# Patient Record
Sex: MALE | Race: WHITE | NOT HISPANIC OR LATINO
[De-identification: names, ages, dates, MRNs, and addresses within clinical notes are randomized per-mention and may not be internally consistent; named-entity substitution may affect disease eponyms.]

---

## 2018-10-15 PROBLEM — Z00.00 ENCOUNTER FOR PREVENTIVE HEALTH EXAMINATION: Status: ACTIVE | Noted: 2018-10-15

## 2018-10-23 ENCOUNTER — APPOINTMENT (OUTPATIENT)
Dept: INTERNAL MEDICINE | Facility: CLINIC | Age: 38
End: 2018-10-23

## 2019-12-19 ENCOUNTER — INPATIENT (INPATIENT)
Facility: HOSPITAL | Age: 39
LOS: 1 days | Discharge: HOME | End: 2019-12-21
Attending: INTERNAL MEDICINE | Admitting: INTERNAL MEDICINE
Payer: COMMERCIAL

## 2019-12-19 VITALS
WEIGHT: 289.03 LBS | SYSTOLIC BLOOD PRESSURE: 129 MMHG | HEIGHT: 74 IN | DIASTOLIC BLOOD PRESSURE: 79 MMHG | OXYGEN SATURATION: 98 % | RESPIRATION RATE: 20 BRPM | TEMPERATURE: 98 F | HEART RATE: 140 BPM

## 2019-12-19 DIAGNOSIS — R00.0 TACHYCARDIA, UNSPECIFIED: ICD-10-CM

## 2019-12-19 DIAGNOSIS — R73.9 HYPERGLYCEMIA, UNSPECIFIED: ICD-10-CM

## 2019-12-19 DIAGNOSIS — E87.1 HYPO-OSMOLALITY AND HYPONATREMIA: ICD-10-CM

## 2019-12-19 DIAGNOSIS — F10.239 ALCOHOL DEPENDENCE WITH WITHDRAWAL, UNSPECIFIED: ICD-10-CM

## 2019-12-19 DIAGNOSIS — R74.0 NONSPECIFIC ELEVATION OF LEVELS OF TRANSAMINASE AND LACTIC ACID DEHYDROGENASE [LDH]: ICD-10-CM

## 2019-12-19 LAB
ALBUMIN SERPL ELPH-MCNC: 2.5 G/DL — LOW (ref 3.3–5)
ALBUMIN SERPL ELPH-MCNC: 2.6 G/DL — LOW (ref 3.3–5)
ALP SERPL-CCNC: 186 U/L — HIGH (ref 40–120)
ALP SERPL-CCNC: 209 U/L — HIGH (ref 40–120)
ALT FLD-CCNC: 162 U/L — HIGH (ref 10–45)
ALT FLD-CCNC: 210 U/L — SIGNIFICANT CHANGE UP (ref 12–78)
ALT FLD-CCNC: SIGNIFICANT CHANGE UP U/L (ref 12–78)
AMYLASE P1 CFR SERPL: 40 U/L — SIGNIFICANT CHANGE UP (ref 25–115)
ANION GAP SERPL CALC-SCNC: 11 MMOL/L — SIGNIFICANT CHANGE UP (ref 5–17)
ANION GAP SERPL CALC-SCNC: 11 MMOL/L — SIGNIFICANT CHANGE UP (ref 5–17)
AST SERPL-CCNC: 354 U/L — SIGNIFICANT CHANGE UP (ref 15–37)
AST SERPL-CCNC: 387 U/L — HIGH (ref 10–40)
AST SERPL-CCNC: SIGNIFICANT CHANGE UP U/L (ref 15–37)
BASOPHILS # BLD AUTO: 0.09 K/UL — SIGNIFICANT CHANGE UP (ref 0–0.2)
BASOPHILS NFR BLD AUTO: 1.2 % — SIGNIFICANT CHANGE UP (ref 0–2)
BILIRUB DIRECT SERPL-MCNC: 2.21 MG/DL — HIGH (ref 0.05–0.2)
BILIRUB INDIRECT FLD-MCNC: 1.5 MG/DL — HIGH (ref 0.2–1)
BILIRUB SERPL-MCNC: 3.7 MG/DL — HIGH (ref 0.2–1.2)
BILIRUB SERPL-MCNC: 4.6 MG/DL — HIGH (ref 0.2–1.2)
BUN SERPL-MCNC: 11 MG/DL — SIGNIFICANT CHANGE UP (ref 7–23)
BUN SERPL-MCNC: 11 MG/DL — SIGNIFICANT CHANGE UP (ref 7–23)
CALCIUM SERPL-MCNC: 7 MG/DL — LOW (ref 8.5–10.1)
CALCIUM SERPL-MCNC: 7 MG/DL — LOW (ref 8.5–10.1)
CHLORIDE SERPL-SCNC: 100 MMOL/L — SIGNIFICANT CHANGE UP (ref 96–108)
CHLORIDE SERPL-SCNC: 91 MMOL/L — LOW (ref 96–108)
CHOLEST SERPL-MCNC: 670 MG/DL — HIGH (ref 10–199)
CO2 SERPL-SCNC: 21 MMOL/L — LOW (ref 22–31)
CO2 SERPL-SCNC: 24 MMOL/L — SIGNIFICANT CHANGE UP (ref 22–31)
CREAT SERPL-MCNC: 1.03 MG/DL — SIGNIFICANT CHANGE UP (ref 0.5–1.3)
CREAT SERPL-MCNC: 1.16 MG/DL — SIGNIFICANT CHANGE UP (ref 0.5–1.3)
EOSINOPHIL # BLD AUTO: 0.03 K/UL — SIGNIFICANT CHANGE UP (ref 0–0.5)
EOSINOPHIL NFR BLD AUTO: 0.4 % — SIGNIFICANT CHANGE UP (ref 0–6)
GLUCOSE BLDC GLUCOMTR-MCNC: 107 MG/DL — HIGH (ref 70–99)
GLUCOSE BLDC GLUCOMTR-MCNC: 141 MG/DL — HIGH (ref 70–99)
GLUCOSE BLDC GLUCOMTR-MCNC: 165 MG/DL — HIGH (ref 70–99)
GLUCOSE SERPL-MCNC: 204 MG/DL — HIGH (ref 70–99)
GLUCOSE SERPL-MCNC: 213 MG/DL — HIGH (ref 70–99)
HCT VFR BLD CALC: 42.4 % — SIGNIFICANT CHANGE UP (ref 39–50)
HDLC SERPL-MCNC: 5 MG/DL — LOW
HGB BLD-MCNC: 15.2 G/DL — SIGNIFICANT CHANGE UP (ref 13–17)
IMM GRANULOCYTES NFR BLD AUTO: 0.4 % — SIGNIFICANT CHANGE UP (ref 0–1.5)
INR BLD: 1.08 RATIO — SIGNIFICANT CHANGE UP (ref 0.88–1.16)
LIDOCAIN IGE QN: 283 U/L — SIGNIFICANT CHANGE UP (ref 73–393)
LIPID PNL WITH DIRECT LDL SERPL: SIGNIFICANT CHANGE UP MG/DL
LYMPHOCYTES # BLD AUTO: 3.38 K/UL — HIGH (ref 1–3.3)
LYMPHOCYTES # BLD AUTO: 45.4 % — HIGH (ref 13–44)
MAGNESIUM SERPL-MCNC: 1.8 MG/DL — SIGNIFICANT CHANGE UP (ref 1.6–2.6)
MAGNESIUM SERPL-MCNC: 2.1 MG/DL — SIGNIFICANT CHANGE UP (ref 1.6–2.6)
MCHC RBC-ENTMCNC: 30.1 PG — SIGNIFICANT CHANGE UP (ref 27–34)
MCHC RBC-ENTMCNC: 35.8 GM/DL — SIGNIFICANT CHANGE UP (ref 32–36)
MCV RBC AUTO: 84 FL — SIGNIFICANT CHANGE UP (ref 80–100)
MONOCYTES # BLD AUTO: 0.54 K/UL — SIGNIFICANT CHANGE UP (ref 0–0.9)
MONOCYTES NFR BLD AUTO: 7.3 % — SIGNIFICANT CHANGE UP (ref 2–14)
NEUTROPHILS # BLD AUTO: 3.37 K/UL — SIGNIFICANT CHANGE UP (ref 1.8–7.4)
NEUTROPHILS NFR BLD AUTO: 45.3 % — SIGNIFICANT CHANGE UP (ref 43–77)
NRBC # BLD: 0 /100 WBCS — SIGNIFICANT CHANGE UP (ref 0–0)
PHOSPHATE SERPL-MCNC: 0.8 MG/DL — CRITICAL LOW (ref 2.5–4.5)
PLATELET # BLD AUTO: 130 K/UL — LOW (ref 150–400)
POTASSIUM SERPL-MCNC: 4 MMOL/L — SIGNIFICANT CHANGE UP (ref 3.5–5.3)
POTASSIUM SERPL-MCNC: 4.8 MMOL/L — SIGNIFICANT CHANGE UP (ref 3.5–5.3)
POTASSIUM SERPL-SCNC: 4 MMOL/L — SIGNIFICANT CHANGE UP (ref 3.5–5.3)
POTASSIUM SERPL-SCNC: 4.8 MMOL/L — SIGNIFICANT CHANGE UP (ref 3.5–5.3)
PROT SERPL-MCNC: 6.9 GM/DL — SIGNIFICANT CHANGE UP (ref 6–8.3)
PROT SERPL-MCNC: 7.6 GM/DL — SIGNIFICANT CHANGE UP (ref 6–8.3)
PROTHROM AB SERPL-ACNC: 12.1 SEC — SIGNIFICANT CHANGE UP (ref 10–12.9)
RBC # BLD: 5.05 M/UL — SIGNIFICANT CHANGE UP (ref 4.2–5.8)
RBC # FLD: 14.4 % — SIGNIFICANT CHANGE UP (ref 10.3–14.5)
SODIUM SERPL-SCNC: 126 MMOL/L — LOW (ref 135–145)
SODIUM SERPL-SCNC: 132 MMOL/L — LOW (ref 135–145)
TOTAL CHOLESTEROL/HDL RATIO MEASUREMENT: 134 RATIO — HIGH (ref 3.4–9.6)
TRIGL SERPL-MCNC: 4080 MG/DL — HIGH (ref 10–149)
TROPONIN I SERPL-MCNC: <.015 NG/ML — SIGNIFICANT CHANGE UP (ref 0.01–0.04)
TSH SERPL-MCNC: 3.54 UIU/ML — SIGNIFICANT CHANGE UP (ref 0.36–3.74)
WBC # BLD: 7.44 K/UL — SIGNIFICANT CHANGE UP (ref 3.8–10.5)
WBC # FLD AUTO: 7.44 K/UL — SIGNIFICANT CHANGE UP (ref 3.8–10.5)

## 2019-12-19 PROCEDURE — 76700 US EXAM ABDOM COMPLETE: CPT | Mod: 26

## 2019-12-19 PROCEDURE — 99285 EMERGENCY DEPT VISIT HI MDM: CPT

## 2019-12-19 PROCEDURE — 71045 X-RAY EXAM CHEST 1 VIEW: CPT | Mod: 26

## 2019-12-19 PROCEDURE — 99223 1ST HOSP IP/OBS HIGH 75: CPT

## 2019-12-19 PROCEDURE — 93010 ELECTROCARDIOGRAM REPORT: CPT

## 2019-12-19 RX ORDER — SODIUM CHLORIDE 9 MG/ML
1000 INJECTION, SOLUTION INTRAVENOUS
Refills: 0 | Status: DISCONTINUED | OUTPATIENT
Start: 2019-12-19 | End: 2019-12-21

## 2019-12-19 RX ORDER — SODIUM CHLORIDE 9 MG/ML
1000 INJECTION INTRAMUSCULAR; INTRAVENOUS; SUBCUTANEOUS ONCE
Refills: 0 | Status: COMPLETED | OUTPATIENT
Start: 2019-12-19 | End: 2019-12-19

## 2019-12-19 RX ORDER — INSULIN LISPRO 100/ML
VIAL (ML) SUBCUTANEOUS AT BEDTIME
Refills: 0 | Status: DISCONTINUED | OUTPATIENT
Start: 2019-12-19 | End: 2019-12-21

## 2019-12-19 RX ORDER — PANTOPRAZOLE SODIUM 20 MG/1
40 TABLET, DELAYED RELEASE ORAL ONCE
Refills: 0 | Status: COMPLETED | OUTPATIENT
Start: 2019-12-19 | End: 2019-12-19

## 2019-12-19 RX ORDER — INSULIN LISPRO 100/ML
VIAL (ML) SUBCUTANEOUS
Refills: 0 | Status: DISCONTINUED | OUTPATIENT
Start: 2019-12-19 | End: 2019-12-21

## 2019-12-19 RX ORDER — DEXTROSE 50 % IN WATER 50 %
25 SYRINGE (ML) INTRAVENOUS ONCE
Refills: 0 | Status: DISCONTINUED | OUTPATIENT
Start: 2019-12-19 | End: 2019-12-21

## 2019-12-19 RX ORDER — SODIUM CHLORIDE 9 MG/ML
1000 INJECTION INTRAMUSCULAR; INTRAVENOUS; SUBCUTANEOUS
Refills: 0 | Status: DISCONTINUED | OUTPATIENT
Start: 2019-12-19 | End: 2019-12-21

## 2019-12-19 RX ORDER — SODIUM CHLORIDE 9 MG/ML
2000 INJECTION INTRAMUSCULAR; INTRAVENOUS; SUBCUTANEOUS ONCE
Refills: 0 | Status: COMPLETED | OUTPATIENT
Start: 2019-12-19 | End: 2019-12-19

## 2019-12-19 RX ORDER — FAMOTIDINE 10 MG/ML
20 INJECTION INTRAVENOUS ONCE
Refills: 0 | Status: COMPLETED | OUTPATIENT
Start: 2019-12-19 | End: 2019-12-19

## 2019-12-19 RX ORDER — DEXTROSE 50 % IN WATER 50 %
15 SYRINGE (ML) INTRAVENOUS ONCE
Refills: 0 | Status: DISCONTINUED | OUTPATIENT
Start: 2019-12-19 | End: 2019-12-21

## 2019-12-19 RX ORDER — INFLUENZA VIRUS VACCINE 15; 15; 15; 15 UG/.5ML; UG/.5ML; UG/.5ML; UG/.5ML
0.5 SUSPENSION INTRAMUSCULAR ONCE
Refills: 0 | Status: COMPLETED | OUTPATIENT
Start: 2019-12-19 | End: 2019-12-19

## 2019-12-19 RX ORDER — GLUCAGON INJECTION, SOLUTION 0.5 MG/.1ML
1 INJECTION, SOLUTION SUBCUTANEOUS ONCE
Refills: 0 | Status: DISCONTINUED | OUTPATIENT
Start: 2019-12-19 | End: 2019-12-21

## 2019-12-19 RX ORDER — DEXTROSE 50 % IN WATER 50 %
12.5 SYRINGE (ML) INTRAVENOUS ONCE
Refills: 0 | Status: DISCONTINUED | OUTPATIENT
Start: 2019-12-19 | End: 2019-12-21

## 2019-12-19 RX ORDER — SODIUM CHLORIDE 9 MG/ML
1000 INJECTION, SOLUTION INTRAVENOUS
Refills: 0 | Status: COMPLETED | OUTPATIENT
Start: 2019-12-19 | End: 2019-12-19

## 2019-12-19 RX ADMIN — SODIUM CHLORIDE 150 MILLILITER(S): 9 INJECTION, SOLUTION INTRAVENOUS at 08:36

## 2019-12-19 RX ADMIN — Medication 50 MILLIGRAM(S): at 11:00

## 2019-12-19 RX ADMIN — SODIUM CHLORIDE 2000 MILLILITER(S): 9 INJECTION INTRAMUSCULAR; INTRAVENOUS; SUBCUTANEOUS at 06:00

## 2019-12-19 RX ADMIN — FAMOTIDINE 20 MILLIGRAM(S): 10 INJECTION INTRAVENOUS at 03:22

## 2019-12-19 RX ADMIN — SODIUM CHLORIDE 80 MILLILITER(S): 9 INJECTION INTRAMUSCULAR; INTRAVENOUS; SUBCUTANEOUS at 18:14

## 2019-12-19 RX ADMIN — SODIUM CHLORIDE 1000 MILLILITER(S): 9 INJECTION INTRAMUSCULAR; INTRAVENOUS; SUBCUTANEOUS at 06:25

## 2019-12-19 RX ADMIN — Medication 85 MILLIMOLE(S): at 15:29

## 2019-12-19 RX ADMIN — PANTOPRAZOLE SODIUM 40 MILLIGRAM(S): 20 TABLET, DELAYED RELEASE ORAL at 03:22

## 2019-12-19 RX ADMIN — SODIUM CHLORIDE 1000 MILLILITER(S): 9 INJECTION INTRAMUSCULAR; INTRAVENOUS; SUBCUTANEOUS at 07:30

## 2019-12-19 RX ADMIN — SODIUM CHLORIDE 1000 MILLILITER(S): 9 INJECTION INTRAMUSCULAR; INTRAVENOUS; SUBCUTANEOUS at 03:20

## 2019-12-19 RX ADMIN — Medication 2 MILLIGRAM(S): at 07:03

## 2019-12-19 RX ADMIN — Medication 30 MILLILITER(S): at 03:23

## 2019-12-19 RX ADMIN — Medication 50 MILLIGRAM(S): at 18:14

## 2019-12-19 NOTE — ED ADULT NURSE NOTE - CHIEF COMPLAINT QUOTE
Pt c/o Palpitation. Pt stated he eat something at his [employer] party that made him nauseous. 1 episode of emesis. denies any drug use

## 2019-12-19 NOTE — H&P ADULT - ASSESSMENT
39 male with history of alchohol abuse presents with tremors tachycardia and ciwa of 8 and will be started on ciwa protocol         IMPROVE VTE Individual Risk Assessment          RISK                                                          Points    [  ] Previous VTE                                                3    [  ] Thrombophilia                                             2    [  ] Lower limb paralysis                                    2        (unable to hold up >15 seconds)      [  ] Current Cancer                                             2         (within 6 months)    [  ] Immobilization > 24 hrs                              1    [  ] ICU/CCU stay > 24 hours                            1    [  ] Age > 60                                                    1    IMPROVE VTE Score _____0____

## 2019-12-19 NOTE — ED PROVIDER NOTE - CLINICAL SUMMARY MEDICAL DECISION MAKING FREE TEXT BOX
Patient p/w palpitations, CIWA 8.  Patient improving with ER measures.  Receiving IVF, banana bag, ativan.  Labs with hyperglycemia, LFTs, lipase/amylase pending.  Lipid profile also pending.  Patient to be admitted after labs result.  Patient signed out to incoming physician Dr. Finnegan.  All decisions regarding the progression of care will be made at their discretion. Patient p/w palpitations, CIWA 8.  Patient improving with ER measures, gastric burning improved.  Receiving IVF, banana bag, ativan.  Labs with hyperglycemia, LFTs, lipase/amylase pending.  Lipid profile also pending.  Patient to be admitted after labs result.  Patient signed out to incoming physician Dr. Finnegan.  All decisions regarding the progression of care will be made at their discretion.

## 2019-12-19 NOTE — ED ADULT NURSE NOTE - OBJECTIVE STATEMENT
Pt c/o Palpitation. Pt stated he eat something at his [employer] party that made him nauseous. 1 episode of emesis. denies any drug use. Very anxious. Awaiting to be seen

## 2019-12-19 NOTE — H&P ADULT - HISTORY OF PRESENT ILLNESS
Patient presents to the ED for palpitations and chest pain.  Patient reports feeling heart racing and pounding for the last hour, says that he's been drinking excessively over the last week, consumes 1 bottle of wine every night and not drinking water.  Last drink was 5 pm yesterday.  He feels substernal pressure and burning going up to his throat as well.  He denies any dyspnea, leg swelling.  Denies tearing chest pain radiating to back.  Also does not have nausea or vomiting. Patient presents to the ED for palpitations and chest pain.  Patient reports feeling heart racing and pounding for the last hour, says that he's been drinking excessively over the last week, consumes 1 bottle of wine every night and not drinking water.  Last drink was 5 pm yesterday.  He feels substernal pressure and burning going up to his throat as well.  He denies any dyspnea, leg swelling.  Denies tearing chest pain radiating to back.  Also does not have nausea or vomiting no wbut he had it yestersday

## 2019-12-19 NOTE — ED PROVIDER NOTE - PROGRESS NOTE DETAILS
Pt was seen and treated by Dr. Fermin for alcohol admission. Pt is alert and oriented x 3 smiling pleasant and sts he felling much better. CIWA score is decreased from 7 to 4 but pt is still having tach  to 125. Dr. Brito is notified and admit pt to Dr. Paula.

## 2019-12-19 NOTE — ED ADULT NURSE NOTE - ED STAT RN HANDOFF DETAILS 2
Report endorsed to oncoming RN. Safety checks compld this shift/Safety rounds completed hourly.  IV sites checked Q2+remains WDL. Meds given as ord with no s/s of adverse RXNs. Fall +skin precs in place. Any issues endorsed to oncoming RN for follow up. Awaiting admit

## 2019-12-19 NOTE — ED PROVIDER NOTE - PHYSICAL EXAMINATION
Gen: Alert, anxious, well appearing  Head: NC, AT, PERRL, EOMI, normal lids/conjunctiva  ENT: normal hearing, patent oropharynx without erythema/exudate, uvula midline  Neck: +supple, no tenderness/meningismus/JVD, +Trachea midline  Pulm: Bilateral BS, normal resp effort, no wheeze/stridor/retractions  CV: tachycardia, no M/R/G, +dist pulses  Abd: soft, NT/ND, Negative [place] signs, +BS, no palpable masses  Mskel: no edema/erythema/cyanosis  Skin: no rash, warm/dry  Neuro: AAOx3, no apparent sensory/motor deficits, coordination intact Gen: Alert, anxious, well appearing  Head: NC, AT, PERRL, EOMI, normal lids/conjunctiva  ENT: normal hearing, patent oropharynx without erythema/exudate, uvula midline  Neck: +supple, no tenderness/meningismus/JVD, +Trachea midline  Pulm: Bilateral BS, normal resp effort, no wheeze/stridor/retractions  CV: tachycardia, no M/R/G, +dist pulses  Abd: soft, +mild epigastric discomfort, ND, Negative [place] signs, +BS, no palpable masses  Mskel: no edema/erythema/cyanosis  Skin: no rash, warm/dry  Neuro: AAOx3, no apparent sensory/motor deficits, coordination intact

## 2019-12-19 NOTE — ED ADULT NURSE NOTE - NSIMPLEMENTINTERV_GEN_ALL_ED
Implemented All Universal Safety Interventions:  [place] to call system. Call bell, personal items and telephone within reach. Instruct patient to call for assistance. Room bathroom lighting operational. Non-slip footwear when patient is off stretcher. Physically safe environment: no spills, clutter or unnecessary equipment. Stretcher in lowest position, wheels locked, appropriate side rails in place.

## 2019-12-19 NOTE — ED ADULT NURSE NOTE - ED STAT RN HANDOFF DETAILS
Report endorsed to oncoming RN NL for my break coverage. Report given to covering RN and patient informed during rounding Safety checks compld this shift/Safety rounds completed hourly.  Fall +skin precs in place. Any issues endorsed to oncoming RN for follow up. RN Assumed patient's care for coverage.

## 2019-12-19 NOTE — ED ADULT TRIAGE NOTE - CHIEF COMPLAINT QUOTE
Pt c/o Palpitation. Pt stated he eat something at his [employer] party that made him nauseous. 1 episode of emesis. denies any drug use

## 2019-12-19 NOTE — ED ADULT NURSE REASSESSMENT NOTE - NS ED NURSE REASSESS COMMENT FT1
Patient received at 0715 n bed 10 , sitting up at bedside, a/o x4 denies any complaints at this time, he is sinus tachycardia on cardiac monitor . Mild hand tremors observed. Will continue to observe. Ambulated with supervision to bathroom and back to bed.

## 2019-12-19 NOTE — H&P ADULT - NSHPREVIEWOFSYSTEMS_GEN_ALL_CORE

## 2019-12-19 NOTE — ED PROVIDER NOTE - OBJECTIVE STATEMENT
Pertinent PMH/PSH/FHx/SHx and Review of Systems contained within:  Patient presents to the ED for palpitations and chest pain.  Patient reports feeling heart racing and pounding for the last hour, says that he's been drinking excessively over the last week, consumes 1 bottle of wine every night and not drinking water.  He feels substernal pressure and burning going up to his throat as well.  He denies any dyspnea, leg swelling.  Denies tearing chest pain radiating to back.  Also does not have nausea or vomiting.     Relevant PMHx/SHx/SOCHx/FAMH:  Denies relevant pmh or psh, no family h/o early cardiac death  Nonsmoker, daily drinker, denies other illicit drugs    ROS: No fever/chills, No headache/photophobia/eye pain/changes in vision, No ear pain/sore throat/dysphagia, no SOB/cough/wheeze/stridor, No abdominal pain, No N/V/D/melena, no dysuria/frequency/discharge, No neck/back pain, no rash, no changes in neurological status/function. Pertinent PMH/PSH/FHx/SHx and Review of Systems contained within:  Patient presents to the ED for palpitations and chest pain.  Patient reports feeling heart racing and pounding for the last hour, says that he's been drinking excessively over the last week, consumes 1 bottle of wine every night and not drinking water.  Last drink was 5 pm yesterday.  He feels substernal pressure and burning going up to his throat as well.  He denies any dyspnea, leg swelling.  Denies tearing chest pain radiating to back.  Also does not have nausea or vomiting.     Relevant PMHx/SHx/SOCHx/FAMH:  Denies relevant pmh or psh, no family h/o early cardiac death  Nonsmoker, daily drinker, denies other illicit drugs    ROS: No fever/chills, No headache/photophobia/eye pain/changes in vision, No ear pain/sore throat/dysphagia, no SOB/cough/wheeze/stridor, No abdominal pain, No N/V/D/melena, no dysuria/frequency/discharge, No neck/back pain, no rash, no changes in neurological status/function.

## 2019-12-19 NOTE — H&P ADULT - NSHPPHYSICALEXAM_GEN_ALL_CORE
ICU Vital Signs Last 24 Hrs  T(C): 37.3 (19 Dec 2019 09:04), Max: 37.3 (19 Dec 2019 07:31)  T(F): 99.2 (19 Dec 2019 09:04), Max: 99.2 (19 Dec 2019 07:31)  HR: 102 (19 Dec 2019 09:04) (101 - 140)  BP: 137/77 (19 Dec 2019 09:04) (129/79 - 152/72)  BP(mean): --  ABP: --  ABP(mean): --  RR: 13 (19 Dec 2019 09:04) (13 - 20)  SpO2: 97% (19 Dec 2019 09:04) (96% - 98%)  GENERAL: NAD well-developed  HEAD:  Atraumatic, Normocephalic  EYES: EOMI, PERRLA, conjunctiva and sclera clear  ENMT: No tonsillar erythema, exudates, or enlargement; Moist mucous membranes, Good dentition, No lesions  NECK: Supple, No JVD, Normal thyroid  NERVOUS SYSTEM:  Alert & Oriented X3, Good concentration; Motor Strength 5/5 B/L upper and lower extremities; DTRs 2+ intact and symmetric  CHEST/LUNG: Clear to percussion bilaterally; No rales, rhonchi, wheezing, or rubs  HEART: Regular rate and rhythm; No murmurs, rubs, or gallops  ABDOMEN: Soft, Nontender, Nondistended; Bowel sounds present  EXTREMITIES:  2+ Peripheral Pulses, No clubbing, cyanosis, or edema  LYMPH: No lymphadenopathy   SKIN: No rashes or lesions ICU Vital Signs Last 24 Hrs  T(C): 37.3 (19 Dec 2019 09:04), Max: 37.3 (19 Dec 2019 07:31)  T(F): 99.2 (19 Dec 2019 09:04), Max: 99.2 (19 Dec 2019 07:31)  HR: 102 (19 Dec 2019 09:04) (101 - 140)  BP: 137/77 (19 Dec 2019 09:04) (129/79 - 152/72)  BP(mean): --  ABP: --  ABP(mean): --  RR: 13 (19 Dec 2019 09:04) (13 - 20)  SpO2: 97% (19 Dec 2019 09:04) (96% - 98%)  GENERAL: NAD well-developed  HEAD:  Atraumatic, Normocephalic  EYES: EOMI, PERRLA, conjunctiva and sclera clear  ENMT: No tonsillar erythema, exudates, or enlargement; Moist mucous membranes, Good dentition, No lesions  NECK: Supple, No JVD, Normal thyroid  NERVOUS SYSTEM:  Alert & Oriented X3, Good concentration; Motor Strength 5/5 B/L upper and lower extremities; DTRs 2+ intact and symmetric  CHEST/LUNG: Clear to percussion bilaterally; No rales, rhonchi, wheezing, or rubs  HEART: Regular rate and rhythm; No murmurs, rubs, or gallops  ABDOMEN: Soft, Nontender, Nondistended; Bowel sounds present  EXTREMITIES:  2+ Peripheral Pulses, No clubbing, cyanosis, or edema POSITIVE tremors   LYMPH: No lymphadenopathy   SKIN: No rashes or lesions

## 2019-12-19 NOTE — ED PROVIDER NOTE - CARE PLAN
Principal Discharge DX:	Alcohol withdrawal  Secondary Diagnosis:	Hyperglycemia  Secondary Diagnosis:	Dehydration

## 2019-12-20 LAB
ALBUMIN SERPL ELPH-MCNC: 2.7 G/DL — LOW (ref 3.3–5)
ALP SERPL-CCNC: 198 U/L — HIGH (ref 40–120)
ALT FLD-CCNC: 174 U/L — HIGH (ref 12–78)
ANION GAP SERPL CALC-SCNC: 7 MMOL/L — SIGNIFICANT CHANGE UP (ref 5–17)
AST SERPL-CCNC: 256 U/L — HIGH (ref 15–37)
BILIRUB DIRECT SERPL-MCNC: 1.99 MG/DL — HIGH (ref 0.05–0.2)
BILIRUB INDIRECT FLD-MCNC: 1.1 MG/DL — HIGH (ref 0.2–1)
BILIRUB SERPL-MCNC: 3.1 MG/DL — HIGH (ref 0.2–1.2)
BUN SERPL-MCNC: 7 MG/DL — SIGNIFICANT CHANGE UP (ref 7–23)
CALCIUM SERPL-MCNC: 8.1 MG/DL — LOW (ref 8.5–10.1)
CHLORIDE SERPL-SCNC: 103 MMOL/L — SIGNIFICANT CHANGE UP (ref 96–108)
CO2 SERPL-SCNC: 26 MMOL/L — SIGNIFICANT CHANGE UP (ref 22–31)
CREAT SERPL-MCNC: 1.01 MG/DL — SIGNIFICANT CHANGE UP (ref 0.5–1.3)
GLUCOSE BLDC GLUCOMTR-MCNC: 112 MG/DL — HIGH (ref 70–99)
GLUCOSE SERPL-MCNC: 118 MG/DL — HIGH (ref 70–99)
HBA1C BLD-MCNC: 6.1 % — HIGH (ref 4–5.6)
HCT VFR BLD CALC: 36.2 % — LOW (ref 39–50)
HGB BLD-MCNC: 12 G/DL — LOW (ref 13–17)
MAGNESIUM SERPL-MCNC: 2.1 MG/DL — SIGNIFICANT CHANGE UP (ref 1.6–2.6)
MCHC RBC-ENTMCNC: 28.8 PG — SIGNIFICANT CHANGE UP (ref 27–34)
MCHC RBC-ENTMCNC: 33.1 GM/DL — SIGNIFICANT CHANGE UP (ref 32–36)
MCV RBC AUTO: 87 FL — SIGNIFICANT CHANGE UP (ref 80–100)
NRBC # BLD: 0 /100 WBCS — SIGNIFICANT CHANGE UP (ref 0–0)
OB PNL STL: POSITIVE
PHOSPHATE SERPL-MCNC: 2.2 MG/DL — LOW (ref 2.5–4.5)
PLATELET # BLD AUTO: 67 K/UL — LOW (ref 150–400)
POTASSIUM SERPL-MCNC: 3.8 MMOL/L — SIGNIFICANT CHANGE UP (ref 3.5–5.3)
POTASSIUM SERPL-SCNC: 3.8 MMOL/L — SIGNIFICANT CHANGE UP (ref 3.5–5.3)
PROT SERPL-MCNC: 6.8 GM/DL — SIGNIFICANT CHANGE UP (ref 6–8.3)
RBC # BLD: 4.16 M/UL — LOW (ref 4.2–5.8)
RBC # FLD: 14.4 % — SIGNIFICANT CHANGE UP (ref 10.3–14.5)
SODIUM SERPL-SCNC: 136 MMOL/L — SIGNIFICANT CHANGE UP (ref 135–145)
WBC # BLD: 5.52 K/UL — SIGNIFICANT CHANGE UP (ref 3.8–10.5)
WBC # FLD AUTO: 5.52 K/UL — SIGNIFICANT CHANGE UP (ref 3.8–10.5)

## 2019-12-20 PROCEDURE — 99233 SBSQ HOSP IP/OBS HIGH 50: CPT

## 2019-12-20 RX ORDER — ATORVASTATIN CALCIUM 80 MG/1
40 TABLET, FILM COATED ORAL AT BEDTIME
Refills: 0 | Status: DISCONTINUED | OUTPATIENT
Start: 2019-12-20 | End: 2019-12-21

## 2019-12-20 RX ORDER — SUCRALFATE 1 G
1 TABLET ORAL
Refills: 0 | Status: DISCONTINUED | OUTPATIENT
Start: 2019-12-20 | End: 2019-12-21

## 2019-12-20 RX ORDER — PANTOPRAZOLE SODIUM 20 MG/1
40 TABLET, DELAYED RELEASE ORAL
Refills: 0 | Status: DISCONTINUED | OUTPATIENT
Start: 2019-12-20 | End: 2019-12-21

## 2019-12-20 RX ORDER — SODIUM,POTASSIUM PHOSPHATES 278-250MG
1 POWDER IN PACKET (EA) ORAL ONCE
Refills: 0 | Status: COMPLETED | OUTPATIENT
Start: 2019-12-20 | End: 2019-12-20

## 2019-12-20 RX ADMIN — Medication 1 PACKET(S): at 22:53

## 2019-12-20 RX ADMIN — Medication 1 GRAM(S): at 13:53

## 2019-12-20 RX ADMIN — Medication 50 MILLIGRAM(S): at 14:58

## 2019-12-20 RX ADMIN — Medication 50 MILLIGRAM(S): at 22:03

## 2019-12-20 RX ADMIN — Medication 50 MILLIGRAM(S): at 00:15

## 2019-12-20 RX ADMIN — Medication 1 GRAM(S): at 19:17

## 2019-12-20 RX ADMIN — Medication 50 MILLIGRAM(S): at 05:34

## 2019-12-20 RX ADMIN — ATORVASTATIN CALCIUM 40 MILLIGRAM(S): 80 TABLET, FILM COATED ORAL at 22:03

## 2019-12-20 NOTE — PROGRESS NOTE ADULT - SUBJECTIVE AND OBJECTIVE BOX
Pt is feeling better today but noticed black stools  Small drop in H/H but stable  electrolytes improved    MEDICATIONS  (STANDING):  chlordiazePOXIDE   Oral   chlordiazePOXIDE 50 milliGRAM(s) Oral every 8 hours  dextrose 5%. 1000 milliLiter(s) (50 mL/Hr) IV Continuous <Continuous>  dextrose 50% Injectable 12.5 Gram(s) IV Push once  dextrose 50% Injectable 25 Gram(s) IV Push once  dextrose 50% Injectable 25 Gram(s) IV Push once  insulin lispro (HumaLOG) corrective regimen sliding scale   SubCutaneous three times a day before meals  insulin lispro (HumaLOG) corrective regimen sliding scale   SubCutaneous at bedtime  pantoprazole    Tablet 40 milliGRAM(s) Oral before breakfast  sodium chloride 0.9%. 1000 milliLiter(s) (80 mL/Hr) IV Continuous <Continuous>  sucralfate suspension 1 Gram(s) Oral four times a day    MEDICATIONS  (PRN):  dextrose 40% Gel 15 Gram(s) Oral once PRN Blood Glucose LESS THAN 70 milliGRAM(s)/deciliter  glucagon  Injectable 1 milliGRAM(s) IntraMuscular once PRN Glucose LESS THAN 70 milligrams/deciliter      Allergies    No Known Allergies    Intolerances        Vital Signs Last 24 Hrs  T(C): 36.7 (20 Dec 2019 05:26), Max: 37.7 (19 Dec 2019 16:25)  T(F): 98.1 (20 Dec 2019 05:26), Max: 99.8 (19 Dec 2019 16:25)  HR: 95 (20 Dec 2019 05:26) (95 - 115)  BP: 135/82 (20 Dec 2019 05:26) (135/82 - 154/91)  BP(mean): --  RR: 18 (20 Dec 2019 05:26) (16 - 18)  SpO2: 97% (20 Dec 2019 05:26) (96% - 98%)    PHYSICAL EXAM:  General: NAD.  CVS: S1, S2  Chest: air entry bilaterally present  Abd: BS present, soft, non-tender      LABS:                        12.0   5.52  )-----------( 67       ( 20 Dec 2019 07:18 )             36.2     12-20    136  |  103  |  7   ----------------------------<  118<H>  3.8   |  26  |  1.01    Ca    8.1<L>      20 Dec 2019 07:18  Phos  2.2     12-20  Mg     2.1     12-20    TPro  6.8  /  Alb  2.7<L>  /  TBili  3.1<H>  /  DBili  1.99<H>  /  AST  256<H>  /  ALT  174<H>  /  AlkPhos  198<H>  12-20    PT/INR - ( 19 Dec 2019 03:12 )   PT: 12.1 sec;   INR: 1.08 ratio           start carafate 1g PO QID and protonix 40mg daily  follow CBC  will arrange for outpatient EGD Pt is feeling better today but noticed black stools  Small drop in H/H but stable; LFT's a little better  electrolytes improved    MEDICATIONS  (STANDING):  chlordiazePOXIDE   Oral   chlordiazePOXIDE 50 milliGRAM(s) Oral every 8 hours  dextrose 5%. 1000 milliLiter(s) (50 mL/Hr) IV Continuous <Continuous>  dextrose 50% Injectable 12.5 Gram(s) IV Push once  dextrose 50% Injectable 25 Gram(s) IV Push once  dextrose 50% Injectable 25 Gram(s) IV Push once  insulin lispro (HumaLOG) corrective regimen sliding scale   SubCutaneous three times a day before meals  insulin lispro (HumaLOG) corrective regimen sliding scale   SubCutaneous at bedtime  pantoprazole    Tablet 40 milliGRAM(s) Oral before breakfast  sodium chloride 0.9%. 1000 milliLiter(s) (80 mL/Hr) IV Continuous <Continuous>  sucralfate suspension 1 Gram(s) Oral four times a day    MEDICATIONS  (PRN):  dextrose 40% Gel 15 Gram(s) Oral once PRN Blood Glucose LESS THAN 70 milliGRAM(s)/deciliter  glucagon  Injectable 1 milliGRAM(s) IntraMuscular once PRN Glucose LESS THAN 70 milligrams/deciliter      Allergies    No Known Allergies    Intolerances        Vital Signs Last 24 Hrs  T(C): 36.7 (20 Dec 2019 05:26), Max: 37.7 (19 Dec 2019 16:25)  T(F): 98.1 (20 Dec 2019 05:26), Max: 99.8 (19 Dec 2019 16:25)  HR: 95 (20 Dec 2019 05:26) (95 - 115)  BP: 135/82 (20 Dec 2019 05:26) (135/82 - 154/91)  BP(mean): --  RR: 18 (20 Dec 2019 05:26) (16 - 18)  SpO2: 97% (20 Dec 2019 05:26) (96% - 98%)    PHYSICAL EXAM:  General: NAD.  CVS: S1, S2  Chest: air entry bilaterally present  Abd: BS present, soft, non-tender      LABS:                        12.0   5.52  )-----------( 67       ( 20 Dec 2019 07:18 )             36.2     12-20    136  |  103  |  7   ----------------------------<  118<H>  3.8   |  26  |  1.01    Ca    8.1<L>      20 Dec 2019 07:18  Phos  2.2     12-20  Mg     2.1     12-20    TPro  6.8  /  Alb  2.7<L>  /  TBili  3.1<H>  /  DBili  1.99<H>  /  AST  256<H>  /  ALT  174<H>  /  AlkPhos  198<H>  12-20    PT/INR - ( 19 Dec 2019 03:12 )   PT: 12.1 sec;   INR: 1.08 ratio           start carafate 1g PO QID and protonix 40mg daily  follow CBC, LFT's  will arrange for outpatient EGD

## 2019-12-20 NOTE — CONSULT NOTE ADULT - SUBJECTIVE AND OBJECTIVE BOX
full consult dictated    Plan: pt admitted with n/v; Pt with h/o alcohol abuse- CT showsd enlarged liver. Long discussion with Pt. Pt informed of severe risks of continued alcohol intake.  Will start carafate - follow CBC - Pt will likely need outpt EGD full consult dictated    Plan: pt admitted with atypical CP - symptopms c/w GERD; Pt with h/o alcohol abuse- U/S shows enlarged liver and hepatic steatosis.  Long discussion with Pt. Pt informed of severe risks of continued alcohol intake.  Will start carafate - follow CBC - Pt will likely need outpt EGD full consult dictated    Plan: pt admitted with atypical CP; had N/V and retching - symptopms c/w GERD; Pt with h/o alcohol abuse- U/S shows enlarged liver and hepatic steatosis.  Long discussion with Pt. Pt informed of severe risks of continued alcohol intake.  Will start carafate - follow CBC - Pt will likely need outpt EGD

## 2019-12-20 NOTE — PROGRESS NOTE ADULT - ASSESSMENT
39 male with history of alchohol abuse presents with tremors tachycardia and ciwa of 8 and will be started on ciwa protocol     Assessment and Plan:     #ETOH withdrawal without complications   CIWA 1-3   continue with librium taper     #LGIB after retching from ETOH use   GERD   states has history of same   patient binge drinks and then vomits   states last time he had similar presentation of brbpr after retching followed by melena and then stopped   GI following, rec starting carafate and EGD outpatient   continue with PPI   monitor H/H closely   transfuse prn if Hgb <7 or pt symptomatic     #Alcoholic hepatitis sec to ETOH dependence   hepatomegaly   Abd US noted   LFTs trending down   GI following     #Hypertriglyceridemia /hypercholesterolemia   Cholesterol, Serum: 670: Test Repeated  Severely lipemic. Results may be affected. mg/dL (12.19.19 @ 11:07)  Triglycerides, Serum: 4080: Test Repeated  Severely lipemic. Results may be affected. mg/dL (12.19.19 @ 11:07)  amylase and lipase wnl   started fenofibrate   continue with IVF     #ETOH dependence, see above   long discussion on importance of ETOH cessation and risks/complications of cirrhosis     #Hypophosphatemia sec to ETOH dependence --replete and monitor     #Anxiety disorder, currently stable     #Preventative measures,   ambulate at [place]   general precautions   A1c 6.1% --prediabetic

## 2019-12-21 ENCOUNTER — TRANSCRIPTION ENCOUNTER (OUTPATIENT)
Age: 39
End: 2019-12-21

## 2019-12-21 VITALS
TEMPERATURE: 99 F | OXYGEN SATURATION: 98 % | DIASTOLIC BLOOD PRESSURE: 86 MMHG | HEART RATE: 96 BPM | RESPIRATION RATE: 18 BRPM | SYSTOLIC BLOOD PRESSURE: 143 MMHG

## 2019-12-21 LAB
ALBUMIN SERPL ELPH-MCNC: 2.8 G/DL — LOW (ref 3.3–5)
ALP SERPL-CCNC: 197 U/L — HIGH (ref 40–120)
ALT FLD-CCNC: 168 U/L — HIGH (ref 12–78)
ANION GAP SERPL CALC-SCNC: 6 MMOL/L — SIGNIFICANT CHANGE UP (ref 5–17)
AST SERPL-CCNC: 195 U/L — HIGH (ref 15–37)
BILIRUB DIRECT SERPL-MCNC: 1.65 MG/DL — HIGH (ref 0.05–0.2)
BILIRUB INDIRECT FLD-MCNC: 0.8 MG/DL — SIGNIFICANT CHANGE UP (ref 0.2–1)
BILIRUB SERPL-MCNC: 2.4 MG/DL — HIGH (ref 0.2–1.2)
BUN SERPL-MCNC: 6 MG/DL — LOW (ref 7–23)
CALCIUM SERPL-MCNC: 8.3 MG/DL — LOW (ref 8.5–10.1)
CHLORIDE SERPL-SCNC: 105 MMOL/L — SIGNIFICANT CHANGE UP (ref 96–108)
CHOLEST SERPL-MCNC: 444 MG/DL — HIGH (ref 10–199)
CO2 SERPL-SCNC: 29 MMOL/L — SIGNIFICANT CHANGE UP (ref 22–31)
CREAT SERPL-MCNC: 1.03 MG/DL — SIGNIFICANT CHANGE UP (ref 0.5–1.3)
GLUCOSE BLDC GLUCOMTR-MCNC: 109 MG/DL — HIGH (ref 70–99)
GLUCOSE SERPL-MCNC: 130 MG/DL — HIGH (ref 70–99)
HCT VFR BLD CALC: 37.5 % — LOW (ref 39–50)
HDLC SERPL-MCNC: 23 MG/DL — LOW
HGB BLD-MCNC: 12 G/DL — LOW (ref 13–17)
LIPID PNL WITH DIRECT LDL SERPL: 375 MG/DL — HIGH
MAGNESIUM SERPL-MCNC: 2.1 MG/DL — SIGNIFICANT CHANGE UP (ref 1.6–2.6)
MCHC RBC-ENTMCNC: 28.9 PG — SIGNIFICANT CHANGE UP (ref 27–34)
MCHC RBC-ENTMCNC: 32 GM/DL — SIGNIFICANT CHANGE UP (ref 32–36)
MCV RBC AUTO: 90.4 FL — SIGNIFICANT CHANGE UP (ref 80–100)
NRBC # BLD: 0 /100 WBCS — SIGNIFICANT CHANGE UP (ref 0–0)
PHOSPHATE SERPL-MCNC: 2.8 MG/DL — SIGNIFICANT CHANGE UP (ref 2.5–4.5)
PLATELET # BLD AUTO: 59 K/UL — LOW (ref 150–400)
POTASSIUM SERPL-MCNC: 3.7 MMOL/L — SIGNIFICANT CHANGE UP (ref 3.5–5.3)
POTASSIUM SERPL-SCNC: 3.7 MMOL/L — SIGNIFICANT CHANGE UP (ref 3.5–5.3)
PROT SERPL-MCNC: 7.2 GM/DL — SIGNIFICANT CHANGE UP (ref 6–8.3)
RBC # BLD: 4.15 M/UL — LOW (ref 4.2–5.8)
RBC # FLD: 14.6 % — HIGH (ref 10.3–14.5)
SODIUM SERPL-SCNC: 140 MMOL/L — SIGNIFICANT CHANGE UP (ref 135–145)
TOTAL CHOLESTEROL/HDL RATIO MEASUREMENT: 19.3 RATIO — HIGH (ref 3.4–9.6)
TRIGL SERPL-MCNC: 229 MG/DL — HIGH (ref 10–149)
WBC # BLD: 6.21 K/UL — SIGNIFICANT CHANGE UP (ref 3.8–10.5)
WBC # FLD AUTO: 6.21 K/UL — SIGNIFICANT CHANGE UP (ref 3.8–10.5)

## 2019-12-21 PROCEDURE — 99239 HOSP IP/OBS DSCHRG MGMT >30: CPT

## 2019-12-21 RX ORDER — PANTOPRAZOLE SODIUM 20 MG/1
1 TABLET, DELAYED RELEASE ORAL
Qty: 30 | Refills: 0
Start: 2019-12-21 | End: 2020-01-19

## 2019-12-21 RX ORDER — FENOFIBRATE,MICRONIZED 130 MG
48 CAPSULE ORAL DAILY
Refills: 0 | Status: DISCONTINUED | OUTPATIENT
Start: 2019-12-21 | End: 2019-12-21

## 2019-12-21 RX ORDER — SUCRALFATE 1 G
10 TABLET ORAL
Qty: 30 | Refills: 0
Start: 2019-12-21 | End: 2020-01-19

## 2019-12-21 RX ADMIN — PANTOPRAZOLE SODIUM 40 MILLIGRAM(S): 20 TABLET, DELAYED RELEASE ORAL at 06:11

## 2019-12-21 RX ADMIN — Medication 50 MILLIGRAM(S): at 06:11

## 2019-12-21 RX ADMIN — Medication 1 GRAM(S): at 11:34

## 2019-12-21 RX ADMIN — Medication 1 GRAM(S): at 06:11

## 2019-12-21 RX ADMIN — Medication 48 MILLIGRAM(S): at 11:34

## 2019-12-21 NOTE — DISCHARGE NOTE NURSING/CASE MANAGEMENT/SOCIAL WORK - NSDCPEWEB_GEN_ALL_CORE
[facility] for Tobacco Control website --- [email or web address] website --- [email or web address]

## 2019-12-21 NOTE — PROGRESS NOTE ADULT - SUBJECTIVE AND OBJECTIVE BOX
Pt seen on rounds this afternoon  No complaints  no bleeding  H/H stable  LFT's slowly improving      MEDICATIONS  (STANDING):  chlordiazePOXIDE   Oral   chlordiazePOXIDE 50 milliGRAM(s) Oral every 12 hours  dextrose 5%. 1000 milliLiter(s) (50 mL/Hr) IV Continuous <Continuous>  dextrose 50% Injectable 12.5 Gram(s) IV Push once  dextrose 50% Injectable 25 Gram(s) IV Push once  dextrose 50% Injectable 25 Gram(s) IV Push once  insulin lispro (HumaLOG) corrective regimen sliding scale   SubCutaneous three times a day before meals  insulin lispro (HumaLOG) corrective regimen sliding scale   SubCutaneous at bedtime  pantoprazole    Tablet 40 milliGRAM(s) Oral before breakfast  sucralfate suspension 1 Gram(s) Oral four times a day    MEDICATIONS  (PRN):  dextrose 40% Gel 15 Gram(s) Oral once PRN Blood Glucose LESS THAN 70 milliGRAM(s)/deciliter  glucagon  Injectable 1 milliGRAM(s) IntraMuscular once PRN Glucose LESS THAN 70 milligrams/deciliter      Allergies    No Known Allergies    Intolerances        Vital Signs Last 24 Hrs  T(C): 37.3 (21 Dec 2019 11:19), Max: 37.3 (21 Dec 2019 11:19)  T(F): 99.1 (21 Dec 2019 11:19), Max: 99.1 (21 Dec 2019 11:19)  HR: 96 (21 Dec 2019 11:19) (89 - 96)  BP: 143/86 (21 Dec 2019 11:19) (137/79 - 146/70)  BP(mean): --  RR: 18 (21 Dec 2019 11:19) (18 - 18)  SpO2: 98% (21 Dec 2019 11:19) (97% - 98%)    PHYSICAL EXAM:  General: NAD.  CVS: S1, S2  Chest: air entry bilaterally present  Abd: BS present, soft, non-tender      LABS:                        12.0   6.21  )-----------( 59       ( 21 Dec 2019 07:43 )             37.5     12-21    140  |  105  |  6<L>  ----------------------------<  130<H>  3.7   |  29  |  1.03    Ca    8.3<L>      21 Dec 2019 07:43  Phos  2.8     12-21  Mg     2.1     12-21    TPro  7.2  /  Alb  2.8<L>  /  TBili  2.4<H>  /  DBili  1.65<H>  /  AST  195<H>  /  ALT  168<H>  /  AlkPhos  197<H>  12-21      Agree with d/c plan  Pt to f/u in office in 1-2 weeks  continue present meds  Pt instructed to avoid alcohol

## 2019-12-21 NOTE — PROGRESS NOTE ADULT - SUBJECTIVE AND OBJECTIVE BOX
INTERVAL HPI/OVERNIGHT EVENTS:  Pt seen and examined at bedside.     Allergies/Intolerance: No Known Allergies      MEDICATIONS  (STANDING):  chlordiazePOXIDE   Oral   chlordiazePOXIDE 50 milliGRAM(s) Oral every 12 hours  dextrose 5%. 1000 milliLiter(s) (50 mL/Hr) IV Continuous <Continuous>  dextrose 50% Injectable 12.5 Gram(s) IV Push once  dextrose 50% Injectable 25 Gram(s) IV Push once  dextrose 50% Injectable 25 Gram(s) IV Push once  fenofibrate Tablet 48 milliGRAM(s) Oral daily  insulin lispro (HumaLOG) corrective regimen sliding scale   SubCutaneous three times a day before meals  insulin lispro (HumaLOG) corrective regimen sliding scale   SubCutaneous at bedtime  pantoprazole    Tablet 40 milliGRAM(s) Oral before breakfast  sucralfate suspension 1 Gram(s) Oral four times a day    MEDICATIONS  (PRN):  dextrose 40% Gel 15 Gram(s) Oral once PRN Blood Glucose LESS THAN 70 milliGRAM(s)/deciliter  glucagon  Injectable 1 milliGRAM(s) IntraMuscular once PRN Glucose LESS THAN 70 milligrams/deciliter        ROS: all systems reviewed and wnl      PHYSICAL EXAMINATION:  Vital Signs Last 24 Hrs  T(C): 37.1 (21 Dec 2019 06:36), Max: 37.1 (21 Dec 2019 06:36)  T(F): 98.8 (21 Dec 2019 06:36), Max: 98.8 (21 Dec 2019 06:36)  HR: 89 (21 Dec 2019 06:36) (89 - 97)  BP: 137/79 (21 Dec 2019 06:36) (125/88 - 146/70)  BP(mean): --  RR: 18 (21 Dec 2019 06:36) (18 - 18)  SpO2: 97% (21 Dec 2019 06:36) (96% - 99%)  CAPILLARY BLOOD GLUCOSE      POCT Blood Glucose.: 132 mg/dL (21 Dec 2019 07:40)  POCT Blood Glucose.: 131 mg/dL (20 Dec 2019 22:12)  POCT Blood Glucose.: 105 mg/dL (20 Dec 2019 16:40)  POCT Blood Glucose.: 120 mg/dL (20 Dec 2019 11:05)        GENERAL:   NECK: supple, No JVD  CHEST/LUNG: clear to auscultation bilaterally; no rales, rhonchi, or wheezing b/l  HEART: normal S1, S2  ABDOMEN: BS+, soft, ND, NT   EXTREMITIES:  pulses palpable; no clubbing, cyanosis, or edema b/l LEs  SKIN: no rashes or lesions      LABS:                        12.0   6.21  )-----------( 59       ( 21 Dec 2019 07:43 )             37.5     12-21    140  |  105  |  6<L>  ----------------------------<  130<H>  3.7   |  29  |  1.03    Ca    8.3<L>      21 Dec 2019 07:43  Phos  2.8     12-21  Mg     2.1     12-21    TPro  7.2  /  Alb  2.8<L>  /  TBili  2.4<H>  /  DBili  1.65<H>  /  AST  195<H>  /  ALT  168<H>  /  AlkPhos  197<H>  12-21 INTERVAL HPI/OVERNIGHT EVENTS:  Pt seen and examined at bedside.     Allergies/Intolerance: No Known Allergies      MEDICATIONS  (STANDING):  chlordiazePOXIDE   Oral   chlordiazePOXIDE 50 milliGRAM(s) Oral every 12 hours  dextrose 5%. 1000 milliLiter(s) (50 mL/Hr) IV Continuous <Continuous>  dextrose 50% Injectable 12.5 Gram(s) IV Push once  dextrose 50% Injectable 25 Gram(s) IV Push once  dextrose 50% Injectable 25 Gram(s) IV Push once  fenofibrate Tablet 48 milliGRAM(s) Oral daily  insulin lispro (HumaLOG) corrective regimen sliding scale   SubCutaneous three times a day before meals  insulin lispro (HumaLOG) corrective regimen sliding scale   SubCutaneous at bedtime  pantoprazole    Tablet 40 milliGRAM(s) Oral before breakfast  sucralfate suspension 1 Gram(s) Oral four times a day    MEDICATIONS  (PRN):  dextrose 40% Gel 15 Gram(s) Oral once PRN Blood Glucose LESS THAN 70 milliGRAM(s)/deciliter  glucagon  Injectable 1 milliGRAM(s) IntraMuscular once PRN Glucose LESS THAN 70 milligrams/deciliter        ROS: all systems reviewed and wnl      PHYSICAL EXAMINATION:  Vital Signs Last 24 Hrs  T(C): 37.1 (21 Dec 2019 06:36), Max: 37.1 (21 Dec 2019 06:36)  T(F): 98.8 (21 Dec 2019 06:36), Max: 98.8 (21 Dec 2019 06:36)  HR: 89 (21 Dec 2019 06:36) (89 - 97)  BP: 137/79 (21 Dec 2019 06:36) (125/88 - 146/70)  BP(mean): --  RR: 18 (21 Dec 2019 06:36) (18 - 18)  SpO2: 97% (21 Dec 2019 06:36) (96% - 99%)  CAPILLARY BLOOD GLUCOSE      POCT Blood Glucose.: 132 mg/dL (21 Dec 2019 07:40)  POCT Blood Glucose.: 131 mg/dL (20 Dec 2019 22:12)  POCT Blood Glucose.: 105 mg/dL (20 Dec 2019 16:40)  POCT Blood Glucose.: 120 mg/dL (20 Dec 2019 11:05)        GENERAL: stable, ambulating on floor, eager for discharge.    NECK: supple, No JVD  CHEST/LUNG: clear to auscultation bilaterally; no rales, rhonchi, or wheezing b/l  HEART: normal S1, S2  ABDOMEN: BS+, soft, ND, NT   EXTREMITIES:  pulses palpable; no clubbing, cyanosis, or edema b/l LEs  SKIN: no rashes or lesions      LABS:                        12.0   6.21  )-----------( 59       ( 21 Dec 2019 07:43 )             37.5     12-21    140  |  105  |  6<L>  ----------------------------<  130<H>  3.7   |  29  |  1.03    Ca    8.3<L>      21 Dec 2019 07:43  Phos  2.8     12-21  Mg     2.1     12-21    TPro  7.2  /  Alb  2.8<L>  /  TBili  2.4<H>  /  DBili  1.65<H>  /  AST  195<H>  /  ALT  168<H>  /  AlkPhos  197<H>  12-21

## 2019-12-21 NOTE — DISCHARGE NOTE NURSING/CASE MANAGEMENT/SOCIAL WORK - PATIENT PORTAL LINK FT
You can access the FollowMyHealth Patient Portal offered by [facility] by registering at the following website: [email or web address]. By joining [employer]’s FollowMyHealth portal, you will also be able to view your health information using other applications (apps) compatible with our system.

## 2019-12-21 NOTE — DISCHARGE NOTE PROVIDER - NSDCCPCAREPLAN_GEN_ALL_CORE_FT
PRINCIPAL DISCHARGE DIAGNOSIS  Diagnosis: Alcohol withdrawal  Assessment and Plan of Treatment: Continue librium carafate protonix      SECONDARY DISCHARGE DIAGNOSES  Diagnosis: Dehydration  Assessment and Plan of Treatment:     Diagnosis: Hyperglycemia  Assessment and Plan of Treatment:

## 2019-12-21 NOTE — DISCHARGE NOTE PROVIDER - HOSPITAL COURSE
patient presents to the ED for palpitations and chest pain.  Patient reports feeling heart racing and pounding for the last hour, says that he's been drinking excessively over the last week, consumes 1 bottle of wine every night and not drinking water. pt admitted for etoh withdrawal.  Cleared for d/c home

## 2019-12-21 NOTE — PROGRESS NOTE ADULT - ASSESSMENT
39 male with history of alchohol abuse presents with tremors tachycardia and ciwa of 8 and will be started on ciwa protocol     Assessment and Plan:     #ETOH withdrawal without complications   CIWA 1-3   continue with librium taper     #LGIB after retching from ETOH use   GERD   states has history of same   patient binge drinks and then vomits   states last time he had similar presentation of brbpr after retching followed by melena and then stopped   GI following, rec starting carafate and EGD outpatient   continue with PPI   monitor H/H closely   transfuse prn if Hgb <7 or pt symptomatic     #Alcoholic hepatitis sec to ETOH dependence   hepatomegaly   Abd US noted   LFTs trending down   GI following     #Hypertriglyceridemia /hypercholesterolemia   Cholesterol, Serum: 670: Test Repeated  Severely lipemic. Results may be affected. mg/dL (12.19.19 @ 11:07)  Triglycerides, Serum: 4080: Test Repeated  Severely lipemic. Results may be affected. mg/dL (12.19.19 @ 11:07)  amylase and lipase wnl   started fenofibrate   continue with IVF     #ETOH dependence, see above   long discussion on importance of ETOH cessation and risks/complications of cirrhosis     #Hypophosphatemia sec to ETOH dependence --replete and monitor     #Anxiety disorder, currently stable     #Preventative measures,   ambulate at [facility]   general precautions   A1c 6.1% --prediabetic 39 male with history of alchohol abuse presents with tremors tachycardia and ciwa of 8 and will be started on ciwa protocol     Assessment and Plan:     #ETOH withdrawal without complications   CIWA 1-3   continue with librium taper for three more days after discharge.     #LGIB after retching from ETOH use   GERD   states has history of same   patient binge drinks and then vomits   states last time he had similar presentation of brbpr after retching followed by melena and then stopped   GI following, rec starting carafate and EGD outpatient   continue with PPI   monitor H/H closely   transfuse prn if Hgb <7 or pt symptomatic     #Alcoholic hepatitis sec to ETOH dependence   hepatomegaly   Abd US noted   LFTs trending down. Hold statin till LFT's normalize.    GI following     #Hypertriglyceridemia /hypercholesterolemia   Cholesterol, Serum: 670: Test Repeated  Severely lipemic. Results may be affected. mg/dL (12.19.19 @ 11:07)  Triglycerides, Serum: 4080: Test Repeated  Severely lipemic. Results may be affected. mg/dL (12.19.19 @ 11:07)  amylase and lipase wnl   started fenofibrate   continue with IVF     #ETOH dependence, see above   long discussion on importance of ETOH cessation and risks/complications of cirrhosis     #Hypophosphatemia sec to ETOH dependence --replete and monitor     #Anxiety disorder, currently stable     #Preventative measures,   ambulate at [place]   general precautions   A1c 6.1% --prediabetic 39 male with history of alchohol abuse presents with tremors tachycardia and ciwa of 8 and will be started on ciwa protocol     Assessment and Plan:     #ETOH withdrawal without complications   CIWA 1-3   continue with librium taper for three more days after discharge.     #LGIB after retching from ETOH use   GERD   states has history of same   patient binge drinks and then vomits   states last time he had similar presentation of brbpr after retching followed by melena and then stopped   GI following, rec starting carafate and EGD outpatient   continue with PPI 40 mg/day of protonix and Carafate QID  monitor H/H closely   transfuse prn if Hgb <7 or pt symptomatic     #Alcoholic hepatitis sec to ETOH dependence   hepatomegaly   Abd US noted   LFTs trending down. Hold statin till LFT's normalize.    GI following     #Hypertriglyceridemia /hypercholesterolemia   Cholesterol, Serum: 670: Test Repeated  Severely lipemic. Results may be affected. mg/dL (12.19.19 @ 11:07)  Triglycerides, Serum: 4080: Test Repeated  Severely lipemic. Results may be affected. mg/dL (12.19.19 @ 11:07)  amylase and lipase wnl   started fenofibrate   continue with IVF     #ETOH dependence, see above   long discussion on importance of ETOH cessation and risks/complications of cirrhosis     #Hypophosphatemia sec to ETOH dependence --replete and monitor     #Anxiety disorder, currently stable     #Preventative measures,   ambulate at [facility]   general precautions   A1c 6.1% --prediabetic

## 2019-12-21 NOTE — DISCHARGE NOTE NURSING/CASE MANAGEMENT/SOCIAL WORK - NSDCPEEMAIL_GEN_ALL_CORE
[facility] email [email or web address]

## 2019-12-21 NOTE — DISCHARGE NOTE PROVIDER - NSDCMRMEDTOKEN_GEN_ALL_CORE_FT
pantoprazole 40 mg oral delayed release tablet: 1 tab(s) orally once a day (before a meal)  sucralfate 1 g/10 mL oral suspension: 10 milliliter(s) orally 4 times a day

## 2019-12-26 DIAGNOSIS — R73.9 HYPERGLYCEMIA, UNSPECIFIED: ICD-10-CM

## 2019-12-26 DIAGNOSIS — E83.39 OTHER DISORDERS OF PHOSPHORUS METABOLISM: ICD-10-CM

## 2019-12-26 DIAGNOSIS — K92.2 GASTROINTESTINAL HEMORRHAGE, UNSPECIFIED: ICD-10-CM

## 2019-12-26 DIAGNOSIS — K70.10 ALCOHOLIC HEPATITIS WITHOUT ASCITES: ICD-10-CM

## 2019-12-26 DIAGNOSIS — R00.0 TACHYCARDIA, UNSPECIFIED: ICD-10-CM

## 2019-12-26 DIAGNOSIS — E86.0 DEHYDRATION: ICD-10-CM

## 2019-12-26 DIAGNOSIS — F41.9 ANXIETY DISORDER, UNSPECIFIED: ICD-10-CM

## 2019-12-26 DIAGNOSIS — K21.9 GASTRO-ESOPHAGEAL REFLUX DISEASE WITHOUT ESOPHAGITIS: ICD-10-CM

## 2019-12-26 DIAGNOSIS — E87.1 HYPO-OSMOLALITY AND HYPONATREMIA: ICD-10-CM

## 2019-12-26 DIAGNOSIS — K70.30 ALCOHOLIC CIRRHOSIS OF LIVER WITHOUT ASCITES: ICD-10-CM

## 2019-12-26 DIAGNOSIS — F10.239 ALCOHOL DEPENDENCE WITH WITHDRAWAL, UNSPECIFIED: ICD-10-CM

## 2019-12-26 DIAGNOSIS — R00.2 PALPITATIONS: ICD-10-CM

## 2020-08-26 NOTE — DISCHARGE NOTE PROVIDER - NSDCHOSPICE_GEN_A_CORE
Pt instructed to self isolate until results of Covid test are known, to f/u with PMD      Gricelda Valles RN  08/26/20 0715 No

## 2021-02-14 NOTE — PATIENT PROFILE ADULT - FUNCTIONAL SCREEN CURRENT LEVEL: BATHING, MLM
Pt soaked finger in sterile saline and betadine per order. Finger was re-dressed per order. 0 = independent

## 2023-05-26 NOTE — DISCHARGE NOTE NURSING/CASE MANAGEMENT/SOCIAL WORK - NSDCVIVACCINE_GEN_ALL_CORE_FT
No Vaccines Administered. DVT PPx: heparin subq  Diet:   Code:   Dispo:   Communication:    Discharge information:  Pharmacy:  PCP: DVT PPx: heparin subq  Diet: regular  Code: FULL  Dispo: pending improvement  Communication: discussed with family at bedside 5/26 AM    Discharge information:  Pharmacy:   PCP: Dr. Adelita Gilmore

## 2025-04-16 NOTE — PROGRESS NOTE ADULT - SUBJECTIVE AND OBJECTIVE BOX
HPI:  Patient presents to the ED for palpitations and chest pain.  Patient reports feeling heart racing and pounding for the last hour, says that he's been drinking excessively over the last week, consumes 1 bottle of wine every night and not drinking water.  Last drink was 5 pm yesterday.  He feels substernal pressure and burning going up to his throat as well.  He denies any dyspnea, leg swelling.  Denies tearing chest pain radiating to back.  Also does not have nausea or vomiting no wbut he had it yestersday (19 Dec 2019 10:10)      SUBJECTIVE & OBJECTIVE: Pt seen and examined at bedside.   No overnight events.   denies anymore brbpr or melena , states BM was normal brown color     Denies fever, chills, N/V, dizziness, HA, cough, CP, palpitations, SOB, abdominal pain, dysuria, diarrhea, constipation.     PHYSICAL EXAM:  Vitals noted.     GENERAL: NAD, appears anxious  HEAD:  Atraumatic, Normocephalic  EYES: EOMI, PERRLA, conjunctiva and sclera clear  ENMT: Moist mucous membranes  NECK: Supple, No JVD  NERVOUS SYSTEM:  Alert & Oriented X3, no focal neuro deficits   CHEST/LUNG: Clear to auscultation bilaterally; No rales, rhonchi, wheezing, or rubs  HEART: Regular rate and rhythm; No murmurs, rubs, or gallops  ABDOMEN: Soft, Nontender, Nondistended; Bowel sounds present. No RUQ pain on palpation   EXTREMITIES:  2+ Peripheral Pulses b/l, No clubbing, cyanosis, or edema b/l     MEDICATIONS  (STANDING):  atorvastatin 40 milliGRAM(s) Oral at bedtime  chlordiazePOXIDE   Oral   chlordiazePOXIDE 50 milliGRAM(s) Oral every 12 hours  dextrose 5%. 1000 milliLiter(s) (50 mL/Hr) IV Continuous <Continuous>  dextrose 50% Injectable 12.5 Gram(s) IV Push once  dextrose 50% Injectable 25 Gram(s) IV Push once  dextrose 50% Injectable 25 Gram(s) IV Push once  insulin lispro (HumaLOG) corrective regimen sliding scale   SubCutaneous three times a day before meals  insulin lispro (HumaLOG) corrective regimen sliding scale   SubCutaneous at bedtime  pantoprazole    Tablet 40 milliGRAM(s) Oral before breakfast  sodium chloride 0.9%. 1000 milliLiter(s) (80 mL/Hr) IV Continuous <Continuous>  sucralfate suspension 1 Gram(s) Oral four times a day    MEDICATIONS  (PRN):  dextrose 40% Gel 15 Gram(s) Oral once PRN Blood Glucose LESS THAN 70 milliGRAM(s)/deciliter  glucagon  Injectable 1 milliGRAM(s) IntraMuscular once PRN Glucose LESS THAN 70 milligrams/deciliter      Labs and imaging reviewed.                                    12.0   5.52  )-----------( 67       ( 20 Dec 2019 07:18 )             36.2   12-20    136  |  103  |  7   ----------------------------<  118<H>  3.8   |  26  |  1.01    Ca    8.1<L>      20 Dec 2019 07:18  Phos  2.2     12-20  Mg     2.1     12-20    TPro  6.8  /  Alb  2.7<L>  /  TBili  3.1<H>  /  DBili  1.99<H>  /  AST  256<H>  /  ALT  174<H>  /  AlkPhos  198<H>  12-20    RADIOLOGY & ADDITIONAL TESTS:  < from: Xray Chest 1 View- PORTABLE-Urgent (12.19.19 @ 03:43) >  IMPRESSION:    Clear lungs.    < end of copied text >    < from: US Abdomen Complete (12.19.19 @ 05:43) >  IMPRESSION:     Hepatomegaly and hepatic steatosis. No ascites.     < end of copied text >    Imaging Personally Reviewed:  [ x] YES  [ ] NO    Consultant(s) Notes Reviewed:  [x ] YES  [ ] NO    Care discussed in detail with patient.  All questions and concerns addressed    Care Discussed with Consultants/Other Providers [x ] YES  [ ] NO Detail Level: Zone